# Patient Record
Sex: MALE | Race: BLACK OR AFRICAN AMERICAN
[De-identification: names, ages, dates, MRNs, and addresses within clinical notes are randomized per-mention and may not be internally consistent; named-entity substitution may affect disease eponyms.]

---

## 2020-05-26 ENCOUNTER — HOSPITAL ENCOUNTER (EMERGENCY)
Dept: HOSPITAL 72 - EMR | Age: 35
Discharge: HOME | End: 2020-05-26
Payer: COMMERCIAL

## 2020-05-26 VITALS — WEIGHT: 210 LBS | BODY MASS INDEX: 31.1 KG/M2 | HEIGHT: 69 IN

## 2020-05-26 VITALS — SYSTOLIC BLOOD PRESSURE: 127 MMHG | DIASTOLIC BLOOD PRESSURE: 74 MMHG

## 2020-05-26 VITALS — DIASTOLIC BLOOD PRESSURE: 86 MMHG | SYSTOLIC BLOOD PRESSURE: 138 MMHG

## 2020-05-26 DIAGNOSIS — S02.2XXA: ICD-10-CM
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
## 2020-05-26 DIAGNOSIS — Z23: ICD-10-CM

## 2020-05-26 DIAGNOSIS — S00.93XA: ICD-10-CM

## 2020-05-26 DIAGNOSIS — S01.112A: Primary | ICD-10-CM
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
## 2020-05-26 PROCEDURE — 99284 EMERGENCY DEPT VISIT MOD MDM: CPT

## 2020-05-26 PROCEDURE — 90715 TDAP VACCINE 7 YRS/> IM: CPT

## 2020-05-26 PROCEDURE — 70486 CT MAXILLOFACIAL W/O DYE: CPT

## 2020-05-26 PROCEDURE — 90471 IMMUNIZATION ADMIN: CPT

## 2020-05-26 PROCEDURE — 70450 CT HEAD/BRAIN W/O DYE: CPT

## 2020-05-26 PROCEDURE — 12011 RPR F/E/E/N/L/M 2.5 CM/<: CPT

## 2020-05-26 NOTE — DIAGNOSTIC IMAGING REPORT
Indications: Trauma to head and right orbit

 

Technique: Spiral images obtained through the facial bones. No IV contrast utilized.

Multiplanar reconstructions were generated.Total dose length product 366 mGycm.

CTDIvol(s) 15 mGy. Dose reduction achieved using automated exposure control

 

Comparison: none

 

Findings: There is a comminuted fracture of the nasal bone. The nasal bone is

compressed inwardly and deviated to the right. There is also a fracture of the nasal

septum, involving the anterior portion of the nasal septum. There is also buckling of

the mid to posterior nasal septum. Uncertain as whether this is related to the recent

trauma or is developmental in nature; suspect the latter. The nasal process of the

maxilla is intact. No definite orbital or sinus fracture is visualized. The optic

globes are intact.

 

There is near complete opacification of the right maxillary sinus. Polyp versus

mucous retention cyst as well as mucosal thickening is seen in the left maxillary

sinus. There is also ethmoid sinus disease. Polyps are seen in the posterior right

nasal fossa. There is also extensive mucosal disease in the anterior nasal fossa. The

dentition is intact. The retroseptal orbits are unremarkable. There is some dominance

of the adenoids and tonsils bilaterally. The other facial soft tissues are

unremarkable. The visualized intracranial structures are unremarkable.

 

Impression: Positive for complex nasal fracture, as described.

 

No definite orbital or sinus fracture. Note, however, complete opacification of the

right maxillary sinus. Suspect that this is on the basis of acute or chronic sinus

disease but the possibility that this represents hemorrhage from acute sinus trauma

should also be considered

 

Fairly extensive sinus and sinonasal disease elsewhere as described

 

The CT scanner at [facility] is accredited by the American College of

[employer]iology and the scans are performed using protocols designed to limit radiation

exposure to as low as reasonably achievable to attain images of sufficient resolution

adequate for diagnostic evaluation.
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
